# Patient Record
Sex: FEMALE | Race: WHITE | NOT HISPANIC OR LATINO | Employment: FULL TIME | URBAN - METROPOLITAN AREA
[De-identification: names, ages, dates, MRNs, and addresses within clinical notes are randomized per-mention and may not be internally consistent; named-entity substitution may affect disease eponyms.]

---

## 2023-09-25 PROBLEM — F41.9 ANXIETY: Status: ACTIVE | Noted: 2023-09-25

## 2023-09-25 PROBLEM — J32.9 CHRONIC SINUSITIS: Status: ACTIVE | Noted: 2023-09-25

## 2023-09-25 PROBLEM — R06.2 WHEEZING: Status: ACTIVE | Noted: 2023-09-25

## 2023-09-25 PROBLEM — J45.909 ASTHMA (HHS-HCC): Status: ACTIVE | Noted: 2023-09-25

## 2023-09-25 RX ORDER — TRIAMCINOLONE ACETONIDE 55 UG/1
2 SPRAY, METERED NASAL DAILY
COMMUNITY
Start: 2022-07-20

## 2023-09-25 RX ORDER — CETIRIZINE HYDROCHLORIDE 10 MG/1
TABLET ORAL
COMMUNITY
Start: 2022-07-20

## 2023-09-25 RX ORDER — MONTELUKAST SODIUM 10 MG/1
1 TABLET ORAL NIGHTLY
COMMUNITY
Start: 2022-07-20 | End: 2024-05-28

## 2023-09-25 RX ORDER — ALBUTEROL SULFATE 90 UG/1
2 INHALANT RESPIRATORY (INHALATION) EVERY 4 HOURS
COMMUNITY
Start: 2022-07-21

## 2023-10-05 ENCOUNTER — APPOINTMENT (OUTPATIENT)
Dept: ALLERGY | Facility: CLINIC | Age: 28
End: 2023-10-05
Payer: COMMERCIAL

## 2024-05-25 DIAGNOSIS — J30.9 ALLERGIC RHINITIS, UNSPECIFIED: ICD-10-CM

## 2024-05-28 DIAGNOSIS — J45.909 ASTHMA, UNSPECIFIED ASTHMA SEVERITY, UNSPECIFIED WHETHER COMPLICATED, UNSPECIFIED WHETHER PERSISTENT (HHS-HCC): Primary | ICD-10-CM

## 2024-05-28 RX ORDER — MONTELUKAST SODIUM 10 MG/1
10 TABLET ORAL NIGHTLY
Qty: 90 TABLET | Refills: 0 | Status: SHIPPED | OUTPATIENT
Start: 2024-05-28

## 2024-06-22 NOTE — PROGRESS NOTES
Subjective        Aure Pavon is a 28 y.o. female who presents for      HPI:      Dr Dela Cruz pt     Chief Complaint   Patient presents with    Shoulder Pain     Left, intermittent x 1 month +  No known injury/fall  Experiencing pain at night, impaired ROM, palpable lump (mildly painful to the touch)  Pt is a seasoned  and still plays intermittently. She has experienced multiple minor injuries and been through PT but never for left shoulder (Pt is right side dominant).  Pt has a friend who is a PT who suggested sxs may be a separation.       when did pain start? Approx 1 month-           Right hand dominant    Plays adult volleyball tournament     did pt fall or have injury?  No    If yes, was injury at work? N/a      any pain at night? Yes      has pain kept patient from doing any activities or going to work? No      Are certain movements or activities more painful than any others? Most ROM      Has pt had to stop performing any activitiesdue to pain? No        what has pt tried for pain? No      what has helped? N/A      has pt ever had xrays/MRI or xray of the shoulder? No    if so, when?N/A    has pt seen ortho? No    has pt seen PT?  No          Pt is a teacher at [employer]         Social History     Tobacco Use   Smoking Status Never   Smokeless Tobacco Never         Review of Systems:        Objective        Vitals:    06/25/24 0937   BP: 114/68   BP Location: Left arm   Patient Position: Sitting   BP Cuff Size: Adult   Pulse: 80   Resp: 14   Temp: 37.2 °C (98.9 °F)   SpO2: 98%   Weight: 79.8 kg (175 lb 14.4 oz)       Patient is alert and oriented x 3 , NAD  EXT-left    shoulder-FROM, no weakness, tender and slight swelling at AC joint   SKIN- no abnormal skin lesions noted  NEURO- no focal deficits  PSYCH- pleasant, normal judgement and insight                  BP Readings from Last 3 Encounters:   06/25/24 114/68   07/20/22 114/70   03/30/22 116/81           Wt Readings from Last 3  Encounters:   06/25/24 79.8 kg (175 lb 14.4 oz)   09/21/23 81.6 kg (180 lb)   09/28/22 81.6 kg (180 lb)         BMI Readings from Last 3 Encounters:   06/25/24 23.86 kg/m²   09/21/23 24.41 kg/m²   09/28/22 24.41 kg/m²     The number and complexity of problems addressed is considered moderate.  The amount and/or complexity of data reviewed and analyzed is considered moderate. The risk of complications and/or morbidity/mortality of patient is considered moderate. Overall, this patient encounter is considered a moderate risk visit.        Assessment/Plan      1. Acute shoulder pain, unspecified laterality        2. Acute pain of left shoulder  XR shoulder left 2+ views          Orders Placed This Encounter   Procedures    XR shoulder left 2+ views           Ordered xray    Will consider  referral to sports medicine, ortho , or PT - patient uncertain which she would like to see at this time           Call if no better or if symptoms worsen

## 2024-06-25 ENCOUNTER — HOSPITAL ENCOUNTER (OUTPATIENT)
Dept: RADIOLOGY | Facility: CLINIC | Age: 29
Discharge: HOME | End: 2024-06-25
Payer: COMMERCIAL

## 2024-06-25 ENCOUNTER — APPOINTMENT (OUTPATIENT)
Dept: PRIMARY CARE | Facility: CLINIC | Age: 29
End: 2024-06-25
Payer: COMMERCIAL

## 2024-06-25 VITALS
RESPIRATION RATE: 14 BRPM | SYSTOLIC BLOOD PRESSURE: 114 MMHG | BODY MASS INDEX: 23.86 KG/M2 | WEIGHT: 175.9 LBS | DIASTOLIC BLOOD PRESSURE: 68 MMHG | HEART RATE: 80 BPM | TEMPERATURE: 98.9 F | OXYGEN SATURATION: 98 %

## 2024-06-25 DIAGNOSIS — M25.512 ACUTE PAIN OF LEFT SHOULDER: ICD-10-CM

## 2024-06-25 DIAGNOSIS — M25.519 ACUTE SHOULDER PAIN, UNSPECIFIED LATERALITY: Primary | ICD-10-CM

## 2024-06-25 PROCEDURE — 1036F TOBACCO NON-USER: CPT | Performed by: FAMILY MEDICINE

## 2024-06-25 PROCEDURE — 73030 X-RAY EXAM OF SHOULDER: CPT | Mod: LT

## 2024-06-25 PROCEDURE — 73030 X-RAY EXAM OF SHOULDER: CPT | Mod: LEFT SIDE | Performed by: RADIOLOGY

## 2024-06-25 PROCEDURE — 99214 OFFICE O/P EST MOD 30 MIN: CPT | Performed by: FAMILY MEDICINE

## 2024-07-10 ENCOUNTER — HOSPITAL ENCOUNTER (OUTPATIENT)
Dept: RADIOLOGY | Facility: EXTERNAL LOCATION | Age: 29
Discharge: HOME | End: 2024-07-10

## 2024-07-10 DIAGNOSIS — R05.9 COUGH, UNSPECIFIED TYPE: ICD-10-CM

## 2024-07-30 NOTE — PROGRESS NOTES
Subjective   Patient ID:   [id number]   Aure Pavon is a 28 y.o. female who presents for Asthma (Follow up, ACT 20).    Chief Complaint   Patient presents with    Asthma     Follow up, ACT 20     Since last visit, 9-21-23, patient reports one day early July 2024, she started having uncontrolled sneezing, rhinorrhea and eye tearing.  This occurred despite using Benadryl and her allergy medications.  Her Sx resolved the next day.  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  She is using Nasacort nasal spray as needed but denies any complaints currently.    Patient's asthma Sx are controlled but she continues to experience intermittent wheezing.  She struggles at night so she will use albuterol prior to bedtime.  Patient requests a refill for montelukast.    Objective   Pulse 72   Wt 79.4 kg (175 lb)   SpO2 100%   BMI 23.73 kg/m²      Physical Exam  Constitutional:       Appearance: Normal appearance.   HENT:      Head: Normocephalic and atraumatic.      Right Ear: External ear normal. There is no impacted cerumen.      Left Ear: External ear normal. There is no impacted cerumen.      Nose: No congestion or rhinorrhea.   Eyes:      Extraocular Movements: Extraocular movements intact.      Conjunctiva/sclera: Conjunctivae normal.      Pupils: Pupils are equal, round, and reactive to light.   Cardiovascular:      Rate and Rhythm: Normal rate and regular rhythm.      Heart sounds: No murmur heard.     No friction rub. No gallop.   Pulmonary:      Effort: No respiratory distress.      Breath sounds: No wheezing, rhonchi or rales.   Skin:     General: Skin is warm and dry.   Neurological:      Mental Status: She is alert.   Psychiatric:         Mood and Affect: Mood normal.         Behavior: Behavior normal.       Assessment/Plan     Asthma ([facility])  ACT is 20.      For the most part she is doing very well.  She is taking montelukast every night.   She uses albuterol as needed.  Typically if she requires albuterol prior to bedtime.  Therefore, was in exacerbation prior.  I would like her to stop the albuterol and changed to air supra.  I will be adding the inhaled steroid as needed will be more effective for overall control.    AR (allergic rhinitis)  She describes an acute episode of allergic rhinitis where she had sneezing rhinorrhea and tearing.  Her symptoms lasted only 1 day.  Benadryl resolved her symptoms, but meantime.  Suspect that it was secondary to [place] trees.  We discussed if she felt that any treatment was necessary that it is.  I did recommend that she uses Zyrtec instead of Benadryl due to safety issues    By signing my name below, I, Dawit Gilmore, attest that this documentation has been prepared under the direction and in the presence of Romina Mckeon MD.  All medical record entries made by the Scribe were at my direction and personally dictated by me. I have reviewed the chart and agree that the record accurately reflects my personal performance of the history, physical exam, discussion and plan.

## 2024-08-01 ENCOUNTER — APPOINTMENT (OUTPATIENT)
Dept: ALLERGY | Facility: CLINIC | Age: 29
End: 2024-08-01
Payer: COMMERCIAL

## 2024-08-01 VITALS — BODY MASS INDEX: 23.73 KG/M2 | HEART RATE: 72 BPM | WEIGHT: 175 LBS | OXYGEN SATURATION: 100 %

## 2024-08-01 DIAGNOSIS — J30.9 ALLERGIC RHINITIS, UNSPECIFIED: ICD-10-CM

## 2024-08-01 DIAGNOSIS — J45.909 ASTHMA, UNSPECIFIED ASTHMA SEVERITY, UNSPECIFIED WHETHER COMPLICATED, UNSPECIFIED WHETHER PERSISTENT (HHS-HCC): Primary | ICD-10-CM

## 2024-08-01 DIAGNOSIS — J30.9 ALLERGIC RHINITIS, UNSPECIFIED SEASONALITY, UNSPECIFIED TRIGGER: ICD-10-CM

## 2024-08-01 PROCEDURE — 96160 PT-FOCUSED HLTH RISK ASSMT: CPT | Performed by: ALLERGY & IMMUNOLOGY

## 2024-08-01 PROCEDURE — 99214 OFFICE O/P EST MOD 30 MIN: CPT | Performed by: ALLERGY & IMMUNOLOGY

## 2024-08-01 RX ORDER — FLUTICASONE PROPIONATE 50 MCG
1 SPRAY, SUSPENSION (ML) NASAL DAILY
COMMUNITY
Start: 2024-07-06 | End: 2024-08-01 | Stop reason: WASHOUT

## 2024-08-01 RX ORDER — ALBUTEROL SULFATE AND BUDESONIDE 90; 80 UG/1; UG/1
2 AEROSOL, METERED RESPIRATORY (INHALATION) EVERY 4 HOURS PRN
Qty: 10.7 G | Refills: 0 | Status: SHIPPED | OUTPATIENT
Start: 2024-08-01 | End: 2025-08-01

## 2024-08-01 RX ORDER — MONTELUKAST SODIUM 10 MG/1
10 TABLET ORAL NIGHTLY
Qty: 90 TABLET | Refills: 3 | Status: SHIPPED | OUTPATIENT
Start: 2024-08-01 | End: 2025-08-01

## 2024-08-01 NOTE — ASSESSMENT & PLAN NOTE
ACT is 20.      For the most part she is doing very well.  She is taking montelukast every night.  She uses albuterol as needed.  Typically if she requires albuterol prior to bedtime.  Therefore, was in exacerbation prior.  I would like her to stop the albuterol and changed to air supra.  I will be adding the inhaled steroid as needed will be more effective for overall control.

## 2024-08-01 NOTE — PATIENT INSTRUCTIONS
Stop albuterol.    Start Airsupra 2 inhalations every  4 hours as needed.  There is a coupon card on [email or web address].[email or web address]    Continue Zyrtec and Nasacort  as needed.     Continue Singulair at bedtime.      Follow up in 1 year or sooner if symptoms worsen prior.

## 2024-08-05 PROBLEM — J30.9 AR (ALLERGIC RHINITIS): Status: ACTIVE | Noted: 2024-08-05

## 2024-08-05 NOTE — ASSESSMENT & PLAN NOTE
She describes an acute episode of allergic rhinitis where she had sneezing rhinorrhea and tearing.  Her symptoms lasted only 1 day.  Benadryl resolved her symptoms, but meantime.  Suspect that it was secondary to [place] trees.  We discussed if she felt that any treatment was necessary that it is.  I did recommend that she uses Zyrtec instead of Benadryl due to safety issues

## 2025-02-25 LAB — NON-UH HIE THINPREP TIS PAP: NORMAL

## 2025-06-05 DIAGNOSIS — J30.9 ALLERGIC RHINITIS, UNSPECIFIED: ICD-10-CM

## 2025-06-05 RX ORDER — MONTELUKAST SODIUM 10 MG/1
10 TABLET ORAL NIGHTLY
Qty: 90 TABLET | Refills: 0 | Status: SHIPPED | OUTPATIENT
Start: 2025-06-05 | End: 2026-06-05

## 2025-08-07 ENCOUNTER — APPOINTMENT (OUTPATIENT)
Dept: ALLERGY | Facility: CLINIC | Age: 30
End: 2025-08-07
Payer: COMMERCIAL